# Patient Record
Sex: FEMALE | Race: WHITE | NOT HISPANIC OR LATINO | Employment: OTHER | URBAN - METROPOLITAN AREA
[De-identification: names, ages, dates, MRNs, and addresses within clinical notes are randomized per-mention and may not be internally consistent; named-entity substitution may affect disease eponyms.]

---

## 2017-11-13 ENCOUNTER — HOSPITAL ENCOUNTER (EMERGENCY)
Facility: MEDICAL CENTER | Age: 52
End: 2017-11-13
Attending: EMERGENCY MEDICINE
Payer: COMMERCIAL

## 2017-11-13 VITALS
HEART RATE: 83 BPM | RESPIRATION RATE: 20 BRPM | WEIGHT: 291.45 LBS | SYSTOLIC BLOOD PRESSURE: 159 MMHG | OXYGEN SATURATION: 93 % | DIASTOLIC BLOOD PRESSURE: 103 MMHG | TEMPERATURE: 97.6 F | HEIGHT: 70 IN | BODY MASS INDEX: 41.72 KG/M2

## 2017-11-13 DIAGNOSIS — G51.0 BELL'S PALSY: ICD-10-CM

## 2017-11-13 LAB — EKG IMPRESSION: NORMAL

## 2017-11-13 PROCEDURE — 93005 ELECTROCARDIOGRAM TRACING: CPT | Performed by: EMERGENCY MEDICINE

## 2017-11-13 PROCEDURE — 99283 EMERGENCY DEPT VISIT LOW MDM: CPT

## 2017-11-13 PROCEDURE — 93005 ELECTROCARDIOGRAM TRACING: CPT

## 2017-11-13 RX ORDER — HYDROCODONE BITARTRATE AND ACETAMINOPHEN 5; 325 MG/1; MG/1
1-2 TABLET ORAL EVERY 6 HOURS PRN
Status: SHIPPED | COMMUNITY
End: 2018-07-10

## 2017-11-13 RX ORDER — PREDNISONE 20 MG/1
40 TABLET ORAL DAILY
Qty: 10 TAB | Refills: 0 | Status: SHIPPED | OUTPATIENT
Start: 2017-11-13 | End: 2017-11-20

## 2017-11-13 RX ORDER — CARVEDILOL 12.5 MG/1
12.5 TABLET ORAL 2 TIMES DAILY WITH MEALS
COMMUNITY

## 2017-11-13 RX ORDER — PREDNISONE 20 MG/1
20 TABLET ORAL DAILY
Status: SHIPPED | COMMUNITY
Start: 2017-11-11 | End: 2017-11-13

## 2017-11-13 RX ORDER — VALACYCLOVIR HYDROCHLORIDE 500 MG/1
500 TABLET, FILM COATED ORAL DAILY
Status: SHIPPED | COMMUNITY
Start: 2017-11-11 | End: 2018-07-10

## 2017-11-13 RX ORDER — LEVOTHYROXINE SODIUM 0.1 MG/1
100 TABLET ORAL
COMMUNITY

## 2017-11-13 RX ORDER — FLECAINIDE ACETATE 100 MG/1
100 TABLET ORAL DAILY
COMMUNITY

## 2017-11-13 ASSESSMENT — PAIN SCALES - GENERAL: PAINLEVEL_OUTOF10: 4

## 2017-11-13 NOTE — ED NOTES
Pt bib family with c/o of right sided facial numbness with dizziness and headache since Saturday. Pt seen in [place] on 11/11 where she had a CT scan performed and was negative. Pt returned to [facility] on 11/12 and prescribed prednisone with amoxicillin for Bell's Palsy.   EKG performed-No STEMI

## 2017-11-14 NOTE — ED PROVIDER NOTES
ED Provider Note    CHIEF COMPLAINT  Chief Complaint   Patient presents with   • Facial Droop   • Numbness        HPI  Demetris Fry is a 52 y.o. female who presents to the ED with complaints of right facial droop. Patient apparently was diagnosed with Bell's palsy and is just here for primarily a 2nd opinion. Patient started having symptoms of right-sided facial tingling and a mild facial droop on the right side. The patient had a CT scan workup done at [facility] on Saturday. Patient was then discharged on Sunday she woke up and noticed that her whole face was completely paralyzed, so she went back to the hospital. At that point she was diagnosed with having Bell's palsy and was started with 20 mg of prednisone and 1000 mg of Valtrex 3 times a day. Patient was concerned and wanted a 2nd opinion, so presented to the emergency department today for evaluation. Upon arrival, just describes right-sided facial flaccidity some tingling to the area. Describes a sensation of being off balance. Denies any overt fevers, chills. Denies any other symptoms. Presents for evaluation.    REVIEW OF SYSTEMS  See HPI for further details. All other systems are negative.     PAST MEDICAL HISTORY  Past Medical History:   Diagnosis Date   • Atrial fibrillation (CMS-[facility])        FAMILY HISTORY  History reviewed. No pertinent family history.  Patient's family history has been discussed and is been found to be noncontributory to his present illness  SOCIAL HISTORY  Social History     Social History   • Marital status:      Spouse name: N/A   • Number of children: N/A   • Years of education: N/A     Social History Main Topics   • Smoking status: Never Smoker   • Smokeless tobacco: Never Used   • Alcohol use Yes      Comment: soc   • Drug use: Unknown   • Sexual activity: Not on file     Other Topics Concern   • Not on file     Social History Narrative   • No narrative on file      Pcp Pt States None  The patientDenies any  "significant tobacco, alcohol or drug use    SURGICAL HISTORY  Past Surgical History:   Procedure Laterality Date   • GYN SURGERY       ,        CURRENT MEDICATIONS  Home Medications     Reviewed by Jovany Schaefer (Pharmacy Tech) on 17 at 1623  Med List Status: Complete   Medication Last Dose Status   Artificial Tear Ointment (LUBRICANT EYE OP) 2017 Active   carvedilol (COREG) 12.5 MG Tab 2017 Active   flecainide (TAMBOCOR) 100 MG Tab 2017 Active   hydrocodone-acetaminophen (NORCO) 5-325 MG Tab per tablet 2017 Active   levothyroxine (SYNTHROID) 100 MCG Tab 2017 Active   predniSONE (DELTASONE) 20 MG Tab 2017 Active   valacyclovir (VALTREX) 500 MG Tab 2017 Active                ALLERGIES  Allergies   Allergen Reactions   • Pcn [Penicillins] Anaphylaxis       PHYSICAL EXAM  VITAL SIGNS: /82   Pulse 71   Temp 36.4 °C (97.6 °F)   Resp 18   Ht 1.778 m (5' 10\")   Wt (!) 132.2 kg (291 lb 7.2 oz)   SpO2 90%   BMI 41.82 kg/m²   Pulse Oximetry was obtained. It showed a reading of Pulse Oximetry: 90 %.  I interpreted this asNon-hypoxic at 90%.   Constitutional: Well developed, Well nourished, No acute distress, Non-toxic appearance.   HENT: Head is normal without signs of trauma. Bowel TMs are normal. Patient does have a right-sided facial droop that includes the forehead. She has a difficult time closing the eye on the right side as well.  Eyes: He was a 3 mm, equal, round, react to light. Extraocular motions are intact. The patient's eyelid, however the right side does not closes easily, is on the left.  Neck: Normal range of motion, No tenderness, Supple, No stridor.   Cardiovascular: Normal heart rate, Normal rhythm, No murmurs, No rubs, No gallops. There are no carotid bruits.   Pulmonary: Normal breath sounds, No respiratory distress, No wheezing, No chest tenderness.   Abdomen: Bowel sounds normal, Soft, No tenderness, No masses, No " pulsatile masses.   Skin: Warm, Dry, No erythema, No rash.   Back: No tenderness, No CVA tenderness.   Extremities: Intact distal pulses, No edema, No tenderness, No cyanosis, No clubbing.   Neurologic: Patient does have a pure cranial nerve, peripheral 7 deficit on the right side. Otherwise, her cranial nerves are otherwise intact. DTRs 2+ and equal throughout. Grossly normal exam  Psychiatric: Affect normal, Judgment normal, Mood normal.         RADIOLOGY/PROCEDURES  None    COURSE & MEDICAL DECISION MAKING  Pertinent Labs & Imaging studies reviewed. (See chart for details)  At this point, clinically, this is Bell's palsy. Looking at current. Literature is recommended 60-80 mg of prednisone a day. The patient has had some problems with prednisone in the past. I do feel the 40 mg a relative and reasonable treatment. The patient currently is on 20 mg of recommended to increase to 40 mg a day for 7 days. The Valtrex is reasonable at thousand milligrams 3 times a day. At this point of recommended increased her prednisone dose. Follow with her primary care physician 1 week. Return as needed.    FINAL IMPRESSION  1. Bell's palsy           The patient will return for new or worsening symptoms and is stable at the time of discharge.    The patient is referred to a primary physician for blood pressure management, diabetic screening, and for all other preventative health concerns.    DISPOSITION:  Patient will be discharged home in stable condition.    FOLLOW UP:  Pcp Pt States None    Schedule an appointment as soon as possible for a visit in 1 week  For re-check, Return if any symptoms worsen      OUTPATIENT MEDICATIONS:  New Prescriptions    PREDNISONE (DELTASONE) 20 MG TAB    Take 2 Tabs by mouth every day for 7 days.           Electronically signed by: Dylan Franco, 11/13/2017 5:09 PM

## 2017-11-14 NOTE — ED NOTES
Dc instructions and prescription reviewed with pt. To f/u with pcp, return for worsening s/s. To take prednisone 40mg po x 1 week and aware of same

## 2017-11-14 NOTE — DISCHARGE INSTRUCTIONS
Bell Palsy  Bell palsy is a condition in which the muscles on one side of the face become paralyzed. This often causes one side of the face to droop. It is a common condition and most people recover completely.  RISK FACTORS  Risk factors for Bell palsy include:  · Pregnancy.  · Diabetes.  · An infection by a virus, such as infections that cause cold sores.  CAUSES   Bell palsy is caused by damage to or inflammation of a nerve in your face. It is unclear why this happens, but an infection by a virus may lead to it. Most of the time the reason it happens is unknown.  SIGNS AND SYMPTOMS   Symptoms can range from mild to severe and can take place over a number of hours. Symptoms may include:  · Being unable to:  ¨ Raise one or both eyebrows.  ¨ Close one or both eyes.  ¨ Feel parts of your face (facial numbness).  · Drooping of the eyelid and corner of the mouth.  · Weakness in the face.  · Paralysis of half your face.  · Loss of taste.  · Sensitivity to loud noises.  · Difficulty chewing.  · Tearing up of the affected eye.  · Dryness in the affected eye.  · Drooling.  · Pain behind one ear.  DIAGNOSIS   Diagnosis of Bell palsy may include:  · A medical history and physical exam.  · An MRI.  · A CT scan.  · Electromyography (EMG). This is a test that checks how your nerves are working.  TREATMENT   Treatment may include antiviral medicine to help shorten the length of the condition. Sometimes treatment is not needed and the symptoms go away on their own.  HOME CARE INSTRUCTIONS   · Take medicines only as directed by your health care provider.  · Do facial massages and exercises as directed by your health care provider.  · If your eye is affected:  ¨ Use moisturizing eye drops to prevent drying of your eye as directed by your health care provider.  ¨ Protect your eye as directed by your health care provider.  SEEK MEDICAL CARE IF:  · Your symptoms do not get better or get worse.  · You are drooling.  · Your eye is red,  irritated, or hurts.  SEEK IMMEDIATE MEDICAL CARE IF:   · Another part of your body feels weak or numb.  · You have difficulty swallowing.  · You have a fever along with symptoms of Bell palsy.  · You develop neck pain.  MAKE SURE YOU:   · Understand these instructions.  · Will watch your condition.  · Will get help right away if you are not doing well or get worse.     This information is not intended to replace advice given to you by your health care provider. Make sure you discuss any questions you have with your health care provider.     Document Released: 12/18/2006 Document Revised: 01/08/2016 Document Reviewed: 03/27/2015  [employer] Interactive Patient Education ©2016 [employer]

## 2017-11-14 NOTE — ED NOTES
"Med rec updated and complete  Allergies reviewed  Pt had RX bottles at bedside, went over RX bottles and returned RX bottles back to pt.  Pt had an RX bottle of AMOXICILLIN 500MG, did not take, severe allergie.  Pt was given VALTREX 500MG take 2 tablets every 8 hour for 7 day course, pt states \"I only take 1 tablet once a day\".      "

## 2018-07-10 ENCOUNTER — APPOINTMENT (OUTPATIENT)
Dept: RADIOLOGY | Facility: MEDICAL CENTER | Age: 53
End: 2018-07-10
Attending: EMERGENCY MEDICINE
Payer: COMMERCIAL

## 2018-07-10 ENCOUNTER — HOSPITAL ENCOUNTER (EMERGENCY)
Facility: MEDICAL CENTER | Age: 53
End: 2018-07-10
Attending: EMERGENCY MEDICINE
Payer: COMMERCIAL

## 2018-07-10 VITALS
BODY MASS INDEX: 41.95 KG/M2 | OXYGEN SATURATION: 91 % | SYSTOLIC BLOOD PRESSURE: 144 MMHG | WEIGHT: 293 LBS | TEMPERATURE: 98.1 F | HEIGHT: 70 IN | RESPIRATION RATE: 17 BRPM | DIASTOLIC BLOOD PRESSURE: 88 MMHG | HEART RATE: 77 BPM

## 2018-07-10 DIAGNOSIS — R00.2 PALPITATIONS: ICD-10-CM

## 2018-07-10 LAB
ALBUMIN SERPL BCP-MCNC: 4.2 G/DL (ref 3.2–4.9)
ALBUMIN/GLOB SERPL: 1.4 G/DL
ALP SERPL-CCNC: 48 U/L (ref 30–99)
ALT SERPL-CCNC: 13 U/L (ref 2–50)
ANION GAP SERPL CALC-SCNC: 7 MMOL/L (ref 0–11.9)
AST SERPL-CCNC: 10 U/L (ref 12–45)
BASOPHILS # BLD AUTO: 0.4 % (ref 0–1.8)
BASOPHILS # BLD: 0.04 K/UL (ref 0–0.12)
BILIRUB SERPL-MCNC: 0.9 MG/DL (ref 0.1–1.5)
BNP SERPL-MCNC: 110 PG/ML (ref 0–100)
BUN SERPL-MCNC: 11 MG/DL (ref 8–22)
CALCIUM SERPL-MCNC: 9.5 MG/DL (ref 8.5–10.5)
CHLORIDE SERPL-SCNC: 107 MMOL/L (ref 96–112)
CO2 SERPL-SCNC: 23 MMOL/L (ref 20–33)
CREAT SERPL-MCNC: 0.7 MG/DL (ref 0.5–1.4)
EKG IMPRESSION: NORMAL
EOSINOPHIL # BLD AUTO: 0.16 K/UL (ref 0–0.51)
EOSINOPHIL NFR BLD: 1.7 % (ref 0–6.9)
ERYTHROCYTE [DISTWIDTH] IN BLOOD BY AUTOMATED COUNT: 45 FL (ref 35.9–50)
GLOBULIN SER CALC-MCNC: 3 G/DL (ref 1.9–3.5)
GLUCOSE SERPL-MCNC: 112 MG/DL (ref 65–99)
HCT VFR BLD AUTO: 42.9 % (ref 37–47)
HGB BLD-MCNC: 14.5 G/DL (ref 12–16)
IMM GRANULOCYTES # BLD AUTO: 0.03 K/UL (ref 0–0.11)
IMM GRANULOCYTES NFR BLD AUTO: 0.3 % (ref 0–0.9)
LYMPHOCYTES # BLD AUTO: 2.03 K/UL (ref 1–4.8)
LYMPHOCYTES NFR BLD: 20.9 % (ref 22–41)
MAGNESIUM SERPL-MCNC: 1.9 MG/DL (ref 1.5–2.5)
MCH RBC QN AUTO: 31.5 PG (ref 27–33)
MCHC RBC AUTO-ENTMCNC: 33.8 G/DL (ref 33.6–35)
MCV RBC AUTO: 93.1 FL (ref 81.4–97.8)
MONOCYTES # BLD AUTO: 0.49 K/UL (ref 0–0.85)
MONOCYTES NFR BLD AUTO: 5.1 % (ref 0–13.4)
NEUTROPHILS # BLD AUTO: 6.94 K/UL (ref 2–7.15)
NEUTROPHILS NFR BLD: 71.6 % (ref 44–72)
NRBC # BLD AUTO: 0 K/UL
NRBC BLD-RTO: 0 /100 WBC
PHOSPHATE SERPL-MCNC: 3.4 MG/DL (ref 2.5–4.5)
PLATELET # BLD AUTO: 335 K/UL (ref 164–446)
PMV BLD AUTO: 10.3 FL (ref 9–12.9)
POTASSIUM SERPL-SCNC: 4 MMOL/L (ref 3.6–5.5)
PROT SERPL-MCNC: 7.2 G/DL (ref 6–8.2)
RBC # BLD AUTO: 4.61 M/UL (ref 4.2–5.4)
SODIUM SERPL-SCNC: 137 MMOL/L (ref 135–145)
TROPONIN I SERPL-MCNC: <0.01 NG/ML (ref 0–0.04)
WBC # BLD AUTO: 9.7 K/UL (ref 4.8–10.8)

## 2018-07-10 PROCEDURE — 84484 ASSAY OF TROPONIN QUANT: CPT

## 2018-07-10 PROCEDURE — 83880 ASSAY OF NATRIURETIC PEPTIDE: CPT

## 2018-07-10 PROCEDURE — 36415 COLL VENOUS BLD VENIPUNCTURE: CPT

## 2018-07-10 PROCEDURE — 93005 ELECTROCARDIOGRAM TRACING: CPT | Performed by: EMERGENCY MEDICINE

## 2018-07-10 PROCEDURE — 93005 ELECTROCARDIOGRAM TRACING: CPT

## 2018-07-10 PROCEDURE — 83735 ASSAY OF MAGNESIUM: CPT

## 2018-07-10 PROCEDURE — 700102 HCHG RX REV CODE 250 W/ 637 OVERRIDE(OP): Performed by: EMERGENCY MEDICINE

## 2018-07-10 PROCEDURE — 71045 X-RAY EXAM CHEST 1 VIEW: CPT

## 2018-07-10 PROCEDURE — 84100 ASSAY OF PHOSPHORUS: CPT

## 2018-07-10 PROCEDURE — 99284 EMERGENCY DEPT VISIT MOD MDM: CPT

## 2018-07-10 PROCEDURE — 80053 COMPREHEN METABOLIC PANEL: CPT

## 2018-07-10 PROCEDURE — A9270 NON-COVERED ITEM OR SERVICE: HCPCS | Performed by: EMERGENCY MEDICINE

## 2018-07-10 PROCEDURE — 85025 COMPLETE CBC W/AUTO DIFF WBC: CPT

## 2018-07-10 RX ORDER — ASPIRIN 325 MG
325 TABLET ORAL ONCE
Status: COMPLETED | OUTPATIENT
Start: 2018-07-10 | End: 2018-07-10

## 2018-07-10 RX ADMIN — ASPIRIN 325 MG: 325 TABLET, FILM COATED ORAL at 12:07

## 2018-07-10 ASSESSMENT — LIFESTYLE VARIABLES: DO YOU DRINK ALCOHOL: NO

## 2018-07-10 ASSESSMENT — PAIN SCALES - GENERAL
PAINLEVEL_OUTOF10: 0
PAINLEVEL_OUTOF10: 0

## 2018-07-10 NOTE — ED PROVIDER NOTES
ED Provider Note    Scribed for Bartolo Arias M.D. by Palmira Palacios. 7/10/2018  11:23 AM    Primary care provider: Pcp Pt States None  Means of arrival: walk in   History obtained from: patient   History limited by: none     CHIEF COMPLAINT  Chief Complaint   Patient presents with   • Palpitations     hx afib, waking up last few nights feeling palpitations       HPI  Demetris Fry is a 52 y.o. female who presents to the Emergency Department for evaluation of intermittent palpitations onset 2-3 days ago. She reports her palpitations have been occurring at night and woke her up twice in the past night. She describes her palpitations as a fast fluttering with skipped beats. She has mild associated shortness of breath but no chest pain or significant stress. Patient was diagnosed with atrial fibrillation 7 years ago and states she has not had any episodes of atrial fibrillation prior to a few days ago. She is not taking any blood thinners including aspirin but confirms taking Coreg. Patient does not have a cardiologist. She also denies any cough, congestion, abdominal pain, nausea and vomiting.       REVIEW OF SYSTEMS  Pertinent positives include palpitations, shortness of breath.   Pertinent negatives include no chest pain, cough, congestion, abdominal pain, nausea, vomiting.    All other systems reviewed and negative. C.       PAST MEDICAL HISTORY   has a past medical history of Atrial fibrillation (CMS-HCC) (HCC).      SURGICAL HISTORY   has a past surgical history that includes gyn surgery.      SOCIAL HISTORY  Social History   Substance Use Topics   • Smoking status: Never Smoker   • Smokeless tobacco: Never Used   • Alcohol use Yes      Comment: soc      History   Drug use: Unknown       FAMILY HISTORY  None noted       CURRENT MEDICATIONS  Home Medications     Reviewed by Isaura Vogt R.N. (Registered Nurse) on 07/10/18 at 1018  Med List Status: Complete   Medication Last Dose Status   Artificial Tear  "Ointment (LUBRICANT EYE OP) 11/13/2017 Active   carvedilol (COREG) 12.5 MG Tab 11/13/2017 Active   flecainide (TAMBOCOR) 100 MG Tab 11/13/2017 Active   levothyroxine (SYNTHROID) 100 MCG Tab 11/13/2017 Active                ALLERGIES  Allergies   Allergen Reactions   • Pcn [Penicillins] Anaphylaxis       PHYSICAL EXAM  VITAL SIGNS: /87   Pulse 80   Temp 36.7 °C (98.1 °F)   Resp 16   Ht 1.778 m (5' 10\")   Wt (!) 135 kg (297 lb 9.9 oz)   SpO2 91%   BMI 42.70 kg/m²   Constitutional: Well developed, Well nourished, no distress, Non-toxic appearance.   HENT: Normocephalic, Atraumatic, Bilateral external ears normal, Oropharynx moist, No oral exudates.   Eyes: PERRLA, EOMI, Conjunctiva normal, No discharge.   Neck: No tenderness, Supple, No stridor.   Lymphatic: No lymphadenopathy noted.   Cardiovascular: Normal heart rate, Normal rhythm.   Thorax & Lungs: Clear to auscultation bilaterally, No respiratory distress, No wheezing, No crackles.   Abdomen: Obese. Soft, No tenderness, No masses, No pulsatile masses.   Skin: Warm, Dry, No erythema, No rash.   Extremities:, No edema No cyanosis.   Musculoskeletal: No tenderness to palpation or major deformities noted.  Intact distal pulses  Neurologic: Awake, alert. Moves all extremities spontaneously.  Psychiatric: Mildly anxious.       LABS  Results for orders placed or performed during the hospital encounter of 07/10/18   CBC w/ Differential   Result Value Ref Range    WBC 9.7 4.8 - 10.8 K/uL    RBC 4.61 4.20 - 5.40 M/uL    Hemoglobin 14.5 12.0 - 16.0 g/dL    Hematocrit 42.9 37.0 - 47.0 %    MCV 93.1 81.4 - 97.8 fL    MCH 31.5 27.0 - 33.0 pg    MCHC 33.8 33.6 - 35.0 g/dL    RDW 45.0 35.9 - 50.0 fL    Platelet Count 335 164 - 446 K/uL    MPV 10.3 9.0 - 12.9 fL    Neutrophils-Polys 71.60 44.00 - 72.00 %    Lymphocytes 20.90 (L) 22.00 - 41.00 %    Monocytes 5.10 0.00 - 13.40 %    Eosinophils 1.70 0.00 - 6.90 %    Basophils 0.40 0.00 - 1.80 %    Immature Granulocytes " 0.30 0.00 - 0.90 %    Nucleated RBC 0.00 /100 WBC    Neutrophils (Absolute) 6.94 2.00 - 7.15 K/uL    Lymphs (Absolute) 2.03 1.00 - 4.80 K/uL    Monos (Absolute) 0.49 0.00 - 0.85 K/uL    Eos (Absolute) 0.16 0.00 - 0.51 K/uL    Baso (Absolute) 0.04 0.00 - 0.12 K/uL    Immature Granulocytes (abs) 0.03 0.00 - 0.11 K/uL    NRBC (Absolute) 0.00 K/uL   Complete Metabolic Panel (CMP)   Result Value Ref Range    Sodium 137 135 - 145 mmol/L    Potassium 4.0 3.6 - 5.5 mmol/L    Chloride 107 96 - 112 mmol/L    Co2 23 20 - 33 mmol/L    Anion Gap 7.0 0.0 - 11.9    Glucose 112 (H) 65 - 99 mg/dL    Bun 11 8 - 22 mg/dL    Creatinine 0.70 0.50 - 1.40 mg/dL    Calcium 9.5 8.5 - 10.5 mg/dL    AST(SGOT) 10 (L) 12 - 45 U/L    ALT(SGPT) 13 2 - 50 U/L    Alkaline Phosphatase 48 30 - 99 U/L    Total Bilirubin 0.9 0.1 - 1.5 mg/dL    Albumin 4.2 3.2 - 4.9 g/dL    Total Protein 7.2 6.0 - 8.2 g/dL    Globulin 3.0 1.9 - 3.5 g/dL    A-G Ratio 1.4 g/dL   Troponin STAT   Result Value Ref Range    Troponin I <0.01 0.00 - 0.04 ng/mL   Btype Natriuretic Peptide   Result Value Ref Range    B Natriuretic Peptide 110 (H) 0 - 100 pg/mL   Magnesium   Result Value Ref Range    Magnesium 1.9 1.5 - 2.5 mg/dL   Phosphorus   Result Value Ref Range    Phosphorus 3.4 2.5 - 4.5 mg/dL   ESTIMATED GFR   Result Value Ref Range    GFR If African American >60 >60 mL/min/1.73 m 2    GFR If Non African American >60 >60 mL/min/1.73 m 2   EKG (NOW)   Result Value Ref Range    Report       [facility] Emergency Dept.    Test Date:  2018-07-10  Pt Name:    YAZ WELLS                Department: ER  MRN:        [id number]                      Room:  Gender:     Female                       Technician: [id number]  :        [id number]                   Requested By:ER TRIAGE PROTOCOL  Order #:    [id number]                    Reading MD: EUGENIA MUNGUIA MD    Measurements  Intervals                                Axis  Rate:       78                            P:          7  MS:         252                          QRS:        9  QRSD:       120                          T:          14  QT:         428  QTc:        488    Interpretive Statements  SINUS RHYTHM  FIRST DEGREE AV BLOCK  IVCD, CONSIDER ATYPICAL RBBB  PROBABLE INFERIOR INFARCT, AGE INDETERMINATE  Compared to ECG 11/13/2017 14:03:14  Myocardial infarct finding now present    Electronically Signed On 7- 11:20:37 PDT by EUGENIA MUNGUIA MD     All labs reviewed by me.        RADIOLOGY  DX-CHEST-PORTABLE (1 VIEW)   Final Result      1.  Marked multichamber cardiac enlargement.   2.  No pneumonia or overt pulmonary edema.      The radiologist's interpretation of all radiological studies have been reviewed by me.      COURSE & MEDICAL DECISION MAKING  Pertinent Labs & Imaging studies reviewed. (See chart for details)    I reviewed the patient's medical records which showed patient has a history of paroxsymal atrial fibrillation.     11:23 AM - Patient seen and examined at bedside. Patient will be treated with aspirin 325 mg. Ordered DX chest, CBC, CMP, troponin, BNP, magnesium, phosphorous and EKG to evaluate her symptoms.     11:32 AM Patient noted to have a CHADS-VASC score of 1.     1:02 PM Patient reevaluated at bedside. . Discussed the results the cardiologist on call.      Decision Making:  Patient is coming of palpitations at night, currently is in normal sinus rhythm, at this point in time I discussed with the patient to take aspirin, the patient's cedrick-vasc score is 1, aspirin will be sufficient, reviewed the case with the cardiologist on call who agrees, I tried to increase the patient's follow-up cardiology appointment however there are no new availabilities. Discussed the results with the patient, have the patient return with any other concerns. Discussed with the patient to use either an appendectomy or some health tracking device in order to track her heart rate at home. The patient will return  for new or worsening symptoms and is stable at the time of discharge.    The patient is referred to a primary physician for blood pressure management, diabetic screening, and for all other preventative health concerns.      DISPOSITION:  Patient will be discharged home in stable condition.      FOLLOW UP:  [facility], Emergency Dept  [address] [id number] [phone]    If symptoms worsen        OUTPATIENT MEDICATIONS:  Discharge Medication List as of 7/10/2018  1:27 PM          FINAL IMPRESSION  1. Palpitations           IPalmira (Radhaiblesia), am scribing for, and in the presence of, Bartolo Arias M.D..  Electronically signed by: Palmira Palacios (Radhaiblesia), 7/10/2018  Bartolo AGUILAR M.D. personally performed the services described in this documentation, as scribed by Palimra Palacios in my presence, and it is both accurate and complete.    The note accurately reflects work and decisions made by me.  Bartolo Arias  7/10/2018  2:21 PM

## 2018-07-10 NOTE — ED TRIAGE NOTES
"Chief Complaint   Patient presents with   • Palpitations     hx afib, waking up last few nights feeling palpitations   EKG done prior to triage. Pt reports \"episodes\" that are waking her up at night. Denies CP, reports SOB during episode.     /87   Pulse 80   Temp 36.7 °C (98.1 °F)   Resp 16   Ht 1.778 m (5' 10\")   Wt (!) 135 kg (297 lb 9.9 oz)   SpO2 91%   BMI 42.70 kg/m²     Pt Informed regarding triage process and verbalized understanding to inform triage tech or RN for any changes in condition.  Placed in lobby.    "

## 2018-07-10 NOTE — DISCHARGE INSTRUCTIONS
Palpitations  A palpitation is the feeling that your heartbeat is irregular or is faster than normal. It may feel like your heart is fluttering or skipping a beat. Palpitations are usually not a serious problem. They may be caused by many things, including smoking, caffeine, alcohol, stress, and certain medicines. Although most causes of palpitations are not serious, palpitations can be a sign of a serious medical problem. In some cases, you may need further medical evaluation.  Follow these instructions at home:  Pay attention to any changes in your symptoms. Take these actions to help with your condition:  · Avoid the following:  ¨ Caffeinated coffee, tea, soft drinks, diet pills, and energy drinks.  ¨ Chocolate.  ¨ Alcohol.  · Do not use any tobacco products, such as cigarettes, chewing tobacco, and e-cigarettes. If you need help quitting, ask your health care provider.  · Try to reduce your stress and anxiety. Things that can help you relax include:  ¨ Yoga.  ¨ Meditation.  ¨ Physical activity, such as swimming, jogging, or walking.  ¨ Biofeedback. This is a method that helps you learn to use your mind to control things in your body, such as your heartbeats.  · Get plenty of rest and sleep.  · Take over-the-counter and prescription medicines only as told by your health care provider.  · Keep all follow-up visits as told by your health care provider. This is important.  Contact a health care provider if:  · You continue to have a fast or irregular heartbeat after 24 hours.  · Your palpitations occur more often.  Get help right away if:  · You have chest pain or shortness of breath.  · You have a severe headache.  · You feel dizzy or you faint.  This information is not intended to replace advice given to you by your health care provider. Make sure you discuss any questions you have with your health care provider.  Document Released: 12/15/2001 Document Revised: 05/22/2017 Document Reviewed: 09/01/2016  [employer]  Interactive Patient Education © 2017 [employer].

## 2018-07-10 NOTE — ED NOTES
Pt amb to room. IV start successful. Blood to lab. ERP eval complete.  Pt medicated as ordered. Pt currently denies symptoms.